# Patient Record
(demographics unavailable — no encounter records)

---

## 2025-01-16 NOTE — HISTORY OF PRESENT ILLNESS
[FreeTextEntry1] : Mr. GRACE DAVILA is a 71-year-old male who presents for initial endocrine evaluation with regard to a history of Type 2 Diabetes Mellitus. The diabetes has been present since 1995. He has always taken insulin ever since he was dx with Type 2 DM. He denies any history of retinopathy, or nephropathy. With regard to neuropathy, he denies any neurologic s/s.   In the distant past, he was hospitalized for 2 weeks where his BGs were high. He reported that he lost sensation in his left sole for a year but regained it.  For the diabetes, he is currently taking Novolin 70-30 55 units pre breakfast daily, and Novolin N 56 units HS.   He is considering using DEXCOM or Parker. Home glucose monitoring has shown values of late to be running in the 150s to 170s in the morning.  He does deny any significant hypoglycemic signs and symptoms of late.  Diet: Does not watch carb intake.   POCT A1C returned today at 7.2%. POCT glucose today at 174 mg/dL.   He denies polyuria, polydipsia, or any visual changes. He also denies any skin lesions, skin breakdown or non-healing areas of skin. He denies any podiatric concerns. Ophthalmologic evaluation is up to date- no diabetic changes noted. Has cataracts  He states that he fell in [place], happened twice--was not hypoglycemic.  Wants to lose weight. His current weight is 209 pounds, previously 206 pounds in [place] 2021. ____________________________________________________________________________________________________  Additional medical history includes that of hypertension, hyperlipidemia  Surgeries: Had procedure done on eye in the past-- accident from a spinning top.   Additional Medications: Simvastatin 40 mg HS, Enalapril 25 mg.   Social Hx: Denies tobacco and alcohol use   Family History: Paternal side Type 2 DM, Hypertension, Colon Ca

## 2025-01-16 NOTE — ADDENDUM
[FreeTextEntry1] : POCT glucose testing and Hemoglobin A1c was carried out today given diabetes diagnosis. Blood will be drawn in office today.  This note was written by Mercedez Miller on 01/16/2025 acting as medical scribe for Dr. Paddy Ruiz. I, Dr. Paddy Ruiz, have read and attest that all the information, medical decision making and discharge instructions within are true and accurate.

## 2025-01-16 NOTE — HISTORY OF PRESENT ILLNESS
[FreeTextEntry1] : Mr. GRACE DAVILA is a 71-year-old male who presents for initial endocrine evaluation with regard to a history of Type 2 Diabetes Mellitus. The diabetes has been present since 1995. He has always taken insulin ever since he was dx with Type 2 DM. He denies any history of retinopathy, or nephropathy. With regard to neuropathy, he denies any neurologic s/s.   In the distant past, he was hospitalized for 2 weeks where his BGs were high. He reported that he lost sensation in his left sole for a year but regained it.  For the diabetes, he is currently taking Novolin 70-30 55 units pre breakfast daily, and Novolin N 56 units HS.   He is considering using DEXCOM or Parker. Home glucose monitoring has shown values of late to be running in the 150s to 170s in the morning.  He does deny any significant hypoglycemic signs and symptoms of late.  Diet: Does not watch carb intake.   POCT A1C returned today at 7.2%. POCT glucose today at 174 mg/dL.   He denies polyuria, polydipsia, or any visual changes. He also denies any skin lesions, skin breakdown or non-healing areas of skin. He denies any podiatric concerns. Ophthalmologic evaluation is up to date- no diabetic changes noted. Has cataracts  He states that he fell in [place], happened twice--was not hypoglycemic.  Wants to lose weight. His current weight is 209 pounds, previously 206 pounds in [place] 2021. ____________________________________________________________________________________________________  Additional medical history includes that of hypertension, hyperlipidemia  Surgeries: Had procedure done on eye in the past-- accident from a spinning top.   Additional Medications: Simvastatin 40 mg HS, Enalapril 25 mg.   Social Hx: Denies tobacco and alcohol use   Family History: Paternal side Type 2 DM, Hypertension, Colon Ca

## 2025-04-22 NOTE — HISTORY OF PRESENT ILLNESS
[FreeTextEntry1] : Mr. GRACE DAVILA is a 71-year-old male who returns with regard to a history of Type 2 Diabetes Mellitus. The diabetes has been present since 1995. He has always taken insulin ever since he was dx with Type 2 DM. He denies any history of retinopathy, or nephropathy. With regard to neuropathy, he denies any neurologic s/s.   In the distant past, he was hospitalized for 2 weeks where his BGs were high. He reported that he lost sensation in his left sole for a year but regained it.  **Pending left eye cataract laser surgery on 05/05/25 with .   He has always and only been on insulin for diabetes. For the diabetes, he is currently taking Novolin 70-30 55 units pre breakfast daily and Novolin N 56 units HS.   He states that his pharmacy is refusing to give him sensors. Home glucose monitoring has shown values of late to be running  BG was low this AM and he had to drink juice.  Diet: Does not watch carb intake.   POCT A1C returned today at 6.4%. Previously returned at 7.2% on 01/16/25. POCT glucose today at 214 mg/dL. -->Not fasting.   He denies polyuria, polydipsia, or any visual changes. He also denies any skin lesions, skin breakdown or non-healing areas of skin. He denies any podiatric concerns. Ophthalmologic evaluation is up to date- no diabetic changes noted. Has cataracts.  Last year he had fallen x 2 while in [place] -not hypoglycemic.  Wants to lose weight. Notes recent weight gain. His current weight is 218 lbs, previously 209 lbs in January 2025. Goal weight: 175 lbs ____________________________________________________________________________________________________  Additional medical history includes that of hypertension, hyperlipidemia  Surgeries: Had procedure done on eye in the past-- accident from a spinning top.   Additional Medications: Simvastatin 40 mg HS, Enalapril 25 mg.

## 2025-04-22 NOTE — ADDENDUM
[FreeTextEntry1] : *** With the above instructions regarding his diabetic related medications, Mr. Grant is stable from an endocrine standpoint for the planned surgical procedure.   POCT glucose testing and Hemoglobin A1c was carried out today given diabetes diagnosis. Blood will be drawn in office today.  This note was written by Mercedez Miller on 04/18/2025 acting as medical scribe for Dr. Paddy Ruiz. I, Dr. Paddy Ruiz, have read and attest that all the information, medical decision making and discharge instructions within are true and accurate.